# Patient Record
Sex: FEMALE | Race: WHITE | Employment: OTHER | URBAN - METROPOLITAN AREA
[De-identification: names, ages, dates, MRNs, and addresses within clinical notes are randomized per-mention and may not be internally consistent; named-entity substitution may affect disease eponyms.]

---

## 2017-10-02 PROCEDURE — 88175 CYTOPATH C/V AUTO FLUID REDO: CPT | Performed by: FAMILY MEDICINE

## 2017-10-02 PROCEDURE — 87624 HPV HI-RISK TYP POOLED RSLT: CPT | Performed by: FAMILY MEDICINE

## 2018-09-07 PROCEDURE — 86480 TB TEST CELL IMMUN MEASURE: CPT | Performed by: INTERNAL MEDICINE

## 2018-09-07 PROCEDURE — 80074 ACUTE HEPATITIS PANEL: CPT | Performed by: INTERNAL MEDICINE

## 2018-09-10 PROCEDURE — 82523 COLLAGEN CROSSLINKS: CPT | Performed by: INTERNAL MEDICINE

## 2018-09-10 PROCEDURE — 36415 COLL VENOUS BLD VENIPUNCTURE: CPT | Performed by: INTERNAL MEDICINE

## 2018-11-16 PROCEDURE — 82523 COLLAGEN CROSSLINKS: CPT | Performed by: INTERNAL MEDICINE

## 2019-03-07 PROCEDURE — 82523 COLLAGEN CROSSLINKS: CPT | Performed by: INTERNAL MEDICINE

## 2021-04-29 PROBLEM — D84.9 IMMUNOCOMPROMISED (HCC): Status: ACTIVE | Noted: 2021-04-29

## 2021-08-19 PROBLEM — M06.09 SERONEGATIVE RHEUMATOID ARTHRITIS OF MULTIPLE SITES (HCC): Status: ACTIVE | Noted: 2021-08-19

## 2024-07-11 NOTE — H&P (VIEW-ONLY)
Demi Dubose is a 73 year old female who presents for a pre-operative physical exam.       HPI:   Scheduled for cervical fusion to be done by Dr Ballard  Losing sensation in right arm,  has been dropping thing     Balance off  Seeing cardio next week  Asthma has been controlled     has been using Breo without side effects    Physically feeling well    no HA  vision ok   no chest pain or SOB   no abdominal pain  bowels normal  No other complaints    Current Outpatient Medications   Medication Sig Dispense Refill   • LEVOTHYROXINE 75 MCG Oral Tab TAKE 1 TABLET(75 MCG) BY MOUTH DAILY 90 tablet 1   • LOSARTAN 100 MG Oral Tab TAKE 1 TABLET(100 MG) BY MOUTH DAILY 90 tablet 1   • methotrexate 2.5 MG Oral Tab Take 7 tablets (17.5 mg total) by mouth once a week. 84 tablet 0   • diazePAM 5 MG Oral Tab 1-2 tablets prior to procedure 10 tablet 0   • Fluticasone Furoate-Vilanterol (BREO ELLIPTA) 100-25 MCG/ACT Inhalation Aerosol Powder, Breath Activated Inhale 1 Inhalation into the lungs daily. 3 each 1   • alendronate 70 MG Oral Tab Take 1 tablet (70 mg total) by mouth once a week. 12 tablet 1   • PREDNISONE 20 MG Oral Tab TAKE 1 TABLET(20 MG) BY MOUTH DAILY 90 tablet 1   • methotrexate 2.5 MG Oral Tab Take 7 tablets (17.5 mg total) by mouth every 7 days. 84 tablet 0   • MONTELUKAST 10 MG Oral Tab TAKE 1 TABLET(10 MG) BY MOUTH EVERY NIGHT 90 tablet 1   • carvedilol 6.25 MG Oral Tab Take 6.25 mg by mouth 2 (two) times daily with meals.     • inFLIXimab (REMICADE IV) Inject into the vein. Every 6 weeks     • folic acid 1 MG Oral Tab Take 1 tablet (1,000 mcg total) by mouth daily. 90 tablet 1   • Cholecalciferol (VITAMIN D OR) 2,000iu 1 tab BID      • CALCIUM OR 1 tablet daily     • albuterol (VENTOLIN HFA) 108 (90 Base) MCG/ACT Inhalation Aero Soln Inhale 2 puffs into the lungs every 4 (four) hours as needed for Wheezing. 54 g 3      Allergies:   Augmentin [Amoxicil*    OTHER (SEE COMMENTS)    Comment:Rash and angioedema              Allergy PCN with rash but reports taking keflex             (multiple prescriptions filled in past)  Clavulanic Acid         RASH, ANGIOEDEMA  Latex                   HIVES  Latex                   HIVES  Pcns [Penicillins]      RASH  Cataflam [Diclofena*    OTHER (SEE COMMENTS)    Comment:Cold sores in mouth  Lisinopril              Coughing   Past Medical History:   Diagnosis Date   • Arrhythmia     tachycardia   • Asthma    • CAD (coronary artery disease)    • Chronic rhinitis    • Dermatographism    • Disorder of thyroid    • Extrinsic asthma, unspecified    • Hashimoto's thyroiditis    • High blood pressure    • Hyponatremia    • PPD positive    • Rheumatoid arthritis(714.0)    • Tuberculosis     TESTED POS INH 9 MONTHS NO SX      Past Surgical History:   Procedure Laterality Date   • OTHER      lumbar spine fusion L4-L5 and discectomy    • OTHER SURGICAL HISTORY  09/1991    rt knee menisectomy   • TMJ ARTHROSCOPY DISCECTOMY  2003    c 5-6   • TOTAL HIP REPLACEMENT Left 07/14/2022    @ [facility]   • TUBAL LIGATION  1981      Family History   Problem Relation Age of Onset   • Other (Other) Mother         encephalitis   • Diabetes Maternal Grandmother    • Diabetes Maternal Grandfather    • Heart Disorder Paternal Grandmother    • Diabetes Paternal Grandmother    • Diabetes Paternal Grandfather    • Heart Disorder Son         PAT   • Arthritis Sister    • Other (Other) Sister         asthma   • Other (sjogren's syndrome) Sister         sjogren's Syndrome   • No Known Problems Daughter    • No Known Problems Daughter    • No Known Problems Son      Social History    Socioeconomic History      Marital status:     Tobacco Use      Smoking status: Never      Smokeless tobacco: Never      Tobacco comments: Father smoked    Vaping Use      Vaping status: Never Used    Substance and Sexual Activity      Alcohol use: Yes        Comment: Rarely      Drug use: No      Sexual activity: Yes        Partners:  Male       REVIEW OF SYSTEMS:   GENERAL: feels well otherwise  SKIN: denies any unusual skin lesions  EYES:denies blurred vision or double vision  HEENT: denies nasal congestion, sinus pain or ST  LUNGS: denies shortness of breath with exertion  CARDIOVASCULAR: denies chest pain on exertion  GI: denies abdominal pain,denies heartburn  MUSCULOSKELETAL: denies back pain  NEURO: denies headaches  PSYCHE: denies depression or anxiety  HEMATOLOGIC: denies hx of anemia  ENDOCRINE: denies thyroid history  ALL/ASTHMA: denies hx of allergy or asthma    EXAM:   Pulse 102   Ht 5' 9\" (1.753 m)   Wt 194 lb 3.2 oz (88.1 kg)   LMP 01/01/2001   SpO2 95%   BMI 28.68 kg/m²   GENERAL: well developed, well nourished, in no apparent distress  SKIN: no rashes, no suspicious lesions  HEENT: atraumatic, normocephalic,ears and throat are clear  EYES:PERRLA, EOMI, normal optic disk, conjunctiva are clear  NECK: supple, no adenopathy,no bruits  LUNGS: clear to auscultation  CARDIO: RRR without murmur  GI: good BS's,no masses, HSM or tenderness  MUSCULOSKELETAL: back is not tender,FROM of the back  EXTREMITIES: no cyanosis, clubbing or edema  NEURO: Oriented times three,cranial nerves are intact,motor and sensory are grossly intact  ECG:  NSR   no acute changes  ASSESSMENT AND PLAN:   Demi Dubose is a 73 year old female who presents for a pre-operative physical exam. Patient is to have cervical fusion, to be done by Dr. Ballard. Pt has the following conditions: HTN   Rheumatoid arthritis. Pt has no significant history of cardiac or pulmonary conditions. Pt is a good surgical candidate.

## 2024-07-18 RX ORDER — MULTIVIT-MIN/IRON FUM/FOLIC AC 7.5 MG-4
1 TABLET ORAL DAILY
COMMUNITY

## 2024-07-18 RX ORDER — FLUTICASONE FUROATE AND VILANTEROL 100; 25 UG/1; UG/1
1 POWDER RESPIRATORY (INHALATION) DAILY
COMMUNITY

## 2024-07-29 ENCOUNTER — ANESTHESIA (OUTPATIENT)
Dept: SURGERY | Facility: HOSPITAL | Age: 74
End: 2024-07-29
Payer: MEDICARE

## 2024-07-29 ENCOUNTER — APPOINTMENT (OUTPATIENT)
Dept: GENERAL RADIOLOGY | Facility: HOSPITAL | Age: 74
End: 2024-07-29
Attending: ORTHOPAEDIC SURGERY
Payer: MEDICARE

## 2024-07-29 ENCOUNTER — ANESTHESIA EVENT (OUTPATIENT)
Dept: SURGERY | Facility: HOSPITAL | Age: 74
End: 2024-07-29
Payer: MEDICARE

## 2024-07-29 ENCOUNTER — HOSPITAL ENCOUNTER (INPATIENT)
Facility: HOSPITAL | Age: 74
LOS: 1 days | Discharge: HOME | End: 2024-07-30
Attending: ORTHOPAEDIC SURGERY | Admitting: ORTHOPAEDIC SURGERY
Payer: MEDICARE

## 2024-07-29 DIAGNOSIS — Z98.1 STATUS POST CERVICAL SPINAL FUSION: Primary | ICD-10-CM

## 2024-07-29 PROCEDURE — 76000 FLUOROSCOPY <1 HR PHYS/QHP: CPT | Performed by: ORTHOPAEDIC SURGERY

## 2024-07-29 PROCEDURE — 97161 PT EVAL LOW COMPLEX 20 MIN: CPT

## 2024-07-29 PROCEDURE — 97116 GAIT TRAINING THERAPY: CPT

## 2024-07-29 PROCEDURE — 0RG10A0 FUSION OF CERVICAL VERTEBRAL JOINT WITH INTERBODY FUSION DEVICE, ANTERIOR APPROACH, ANTERIOR COLUMN, OPEN APPROACH: ICD-10-PCS | Performed by: ORTHOPAEDIC SURGERY

## 2024-07-29 PROCEDURE — 01N10ZZ RELEASE CERVICAL NERVE, OPEN APPROACH: ICD-10-PCS | Performed by: ORTHOPAEDIC SURGERY

## 2024-07-29 PROCEDURE — 4A11X4G MONITORING OF PERIPHERAL NERVOUS ELECTRICAL ACTIVITY, INTRAOPERATIVE, EXTERNAL APPROACH: ICD-10-PCS | Performed by: ORTHOPAEDIC SURGERY

## 2024-07-29 PROCEDURE — 94799 UNLISTED PULMONARY SVC/PX: CPT

## 2024-07-29 PROCEDURE — 0RT30ZZ RESECTION OF CERVICAL VERTEBRAL DISC, OPEN APPROACH: ICD-10-PCS | Performed by: ORTHOPAEDIC SURGERY

## 2024-07-29 PROCEDURE — 97530 THERAPEUTIC ACTIVITIES: CPT

## 2024-07-29 DEVICE — PLATE SPNL L20MM 1.6V ANT CERV TI ALLY LEV 1: Type: IMPLANTABLE DEVICE | Site: SPINE CERVICAL | Status: FUNCTIONAL

## 2024-07-29 DEVICE — IMPLANTABLE DEVICE: Type: IMPLANTABLE DEVICE | Site: SPINE CERVICAL | Status: FUNCTIONAL

## 2024-07-29 DEVICE — SCREW SPNL 3.5X13MM ANT CERV TI ALLY ST: Type: IMPLANTABLE DEVICE | Site: SPINE CERVICAL | Status: FUNCTIONAL

## 2024-07-29 DEVICE — COHERE CERVICAL, 7X14X12MM 7°
Type: IMPLANTABLE DEVICE | Site: NECK | Status: FUNCTIONAL
Brand: COHERE

## 2024-07-29 DEVICE — PROPEL PUTTY, SMALL
Type: IMPLANTABLE DEVICE | Site: NECK | Status: FUNCTIONAL
Brand: PROPEL

## 2024-07-29 RX ORDER — LOSARTAN POTASSIUM 100 MG/1
100 TABLET ORAL DAILY
Status: DISCONTINUED | OUTPATIENT
Start: 2024-07-29 | End: 2024-07-30

## 2024-07-29 RX ORDER — HYDROMORPHONE HYDROCHLORIDE 1 MG/ML
0.2 INJECTION, SOLUTION INTRAMUSCULAR; INTRAVENOUS; SUBCUTANEOUS EVERY 2 HOUR PRN
Status: DISCONTINUED | OUTPATIENT
Start: 2024-07-29 | End: 2024-07-30

## 2024-07-29 RX ORDER — BISACODYL 10 MG
10 SUPPOSITORY, RECTAL RECTAL
Status: DISCONTINUED | OUTPATIENT
Start: 2024-07-29 | End: 2024-07-30

## 2024-07-29 RX ORDER — LIDOCAINE HYDROCHLORIDE 10 MG/ML
INJECTION, SOLUTION EPIDURAL; INFILTRATION; INTRACAUDAL; PERINEURAL AS NEEDED
Status: DISCONTINUED | OUTPATIENT
Start: 2024-07-29 | End: 2024-07-29 | Stop reason: SURG

## 2024-07-29 RX ORDER — HYDROMORPHONE HYDROCHLORIDE 1 MG/ML
0.6 INJECTION, SOLUTION INTRAMUSCULAR; INTRAVENOUS; SUBCUTANEOUS EVERY 5 MIN PRN
Status: DISCONTINUED | OUTPATIENT
Start: 2024-07-29 | End: 2024-07-29 | Stop reason: HOSPADM

## 2024-07-29 RX ORDER — TIZANIDINE 4 MG/1
4 TABLET ORAL 3 TIMES DAILY
Status: DISCONTINUED | OUTPATIENT
Start: 2024-07-29 | End: 2024-07-30

## 2024-07-29 RX ORDER — MONTELUKAST SODIUM 10 MG/1
10 TABLET ORAL NIGHTLY
Status: DISCONTINUED | OUTPATIENT
Start: 2024-07-29 | End: 2024-07-30

## 2024-07-29 RX ORDER — DIPHENHYDRAMINE HCL 25 MG
25 CAPSULE ORAL EVERY 4 HOURS PRN
Status: DISCONTINUED | OUTPATIENT
Start: 2024-07-29 | End: 2024-07-30

## 2024-07-29 RX ORDER — GLYCOPYRROLATE 0.2 MG/ML
INJECTION, SOLUTION INTRAMUSCULAR; INTRAVENOUS AS NEEDED
Status: DISCONTINUED | OUTPATIENT
Start: 2024-07-29 | End: 2024-07-29 | Stop reason: SURG

## 2024-07-29 RX ORDER — SODIUM CHLORIDE, SODIUM LACTATE, POTASSIUM CHLORIDE, CALCIUM CHLORIDE 600; 310; 30; 20 MG/100ML; MG/100ML; MG/100ML; MG/100ML
INJECTION, SOLUTION INTRAVENOUS CONTINUOUS
Status: DISCONTINUED | OUTPATIENT
Start: 2024-07-29 | End: 2024-07-30

## 2024-07-29 RX ORDER — ONDANSETRON 2 MG/ML
INJECTION INTRAMUSCULAR; INTRAVENOUS AS NEEDED
Status: DISCONTINUED | OUTPATIENT
Start: 2024-07-29 | End: 2024-07-29 | Stop reason: SURG

## 2024-07-29 RX ORDER — ENEMA 19; 7 G/133ML; G/133ML
1 ENEMA RECTAL ONCE AS NEEDED
Status: DISCONTINUED | OUTPATIENT
Start: 2024-07-29 | End: 2024-07-30

## 2024-07-29 RX ORDER — SODIUM CHLORIDE, SODIUM LACTATE, POTASSIUM CHLORIDE, CALCIUM CHLORIDE 600; 310; 30; 20 MG/100ML; MG/100ML; MG/100ML; MG/100ML
INJECTION, SOLUTION INTRAVENOUS CONTINUOUS
Status: DISCONTINUED | OUTPATIENT
Start: 2024-07-29 | End: 2024-07-29 | Stop reason: HOSPADM

## 2024-07-29 RX ORDER — MORPHINE SULFATE 4 MG/ML
2 INJECTION, SOLUTION INTRAMUSCULAR; INTRAVENOUS EVERY 10 MIN PRN
Status: DISCONTINUED | OUTPATIENT
Start: 2024-07-29 | End: 2024-07-29 | Stop reason: HOSPADM

## 2024-07-29 RX ORDER — DEXAMETHASONE SODIUM PHOSPHATE 4 MG/ML
VIAL (ML) INJECTION AS NEEDED
Status: DISCONTINUED | OUTPATIENT
Start: 2024-07-29 | End: 2024-07-29 | Stop reason: SURG

## 2024-07-29 RX ORDER — LEVOTHYROXINE SODIUM 0.07 MG/1
75 TABLET ORAL
Status: DISCONTINUED | OUTPATIENT
Start: 2024-07-29 | End: 2024-07-30

## 2024-07-29 RX ORDER — POLYETHYLENE GLYCOL 3350 17 G/17G
17 POWDER, FOR SOLUTION ORAL DAILY PRN
Status: DISCONTINUED | OUTPATIENT
Start: 2024-07-29 | End: 2024-07-30

## 2024-07-29 RX ORDER — NALOXONE HYDROCHLORIDE 0.4 MG/ML
80 INJECTION, SOLUTION INTRAMUSCULAR; INTRAVENOUS; SUBCUTANEOUS AS NEEDED
Status: DISCONTINUED | OUTPATIENT
Start: 2024-07-29 | End: 2024-07-29 | Stop reason: HOSPADM

## 2024-07-29 RX ORDER — MORPHINE SULFATE 10 MG/ML
6 INJECTION, SOLUTION INTRAMUSCULAR; INTRAVENOUS EVERY 10 MIN PRN
Status: DISCONTINUED | OUTPATIENT
Start: 2024-07-29 | End: 2024-07-29 | Stop reason: HOSPADM

## 2024-07-29 RX ORDER — HYDROMORPHONE HYDROCHLORIDE 1 MG/ML
0.4 INJECTION, SOLUTION INTRAMUSCULAR; INTRAVENOUS; SUBCUTANEOUS EVERY 2 HOUR PRN
Status: DISCONTINUED | OUTPATIENT
Start: 2024-07-29 | End: 2024-07-30

## 2024-07-29 RX ORDER — SODIUM CHLORIDE, SODIUM LACTATE, POTASSIUM CHLORIDE, CALCIUM CHLORIDE 600; 310; 30; 20 MG/100ML; MG/100ML; MG/100ML; MG/100ML
INJECTION, SOLUTION INTRAVENOUS CONTINUOUS PRN
Status: DISCONTINUED | OUTPATIENT
Start: 2024-07-29 | End: 2024-07-29 | Stop reason: SURG

## 2024-07-29 RX ORDER — DIPHENHYDRAMINE HYDROCHLORIDE 50 MG/ML
25 INJECTION INTRAMUSCULAR; INTRAVENOUS EVERY 4 HOURS PRN
Status: DISCONTINUED | OUTPATIENT
Start: 2024-07-29 | End: 2024-07-30

## 2024-07-29 RX ORDER — ROCURONIUM BROMIDE 10 MG/ML
INJECTION, SOLUTION INTRAVENOUS AS NEEDED
Status: DISCONTINUED | OUTPATIENT
Start: 2024-07-29 | End: 2024-07-29 | Stop reason: SURG

## 2024-07-29 RX ORDER — FLUTICASONE PROPIONATE AND SALMETEROL 100; 50 UG/1; UG/1
1 POWDER RESPIRATORY (INHALATION) 2 TIMES DAILY
Status: DISCONTINUED | OUTPATIENT
Start: 2024-07-29 | End: 2024-07-30

## 2024-07-29 RX ORDER — CARVEDILOL 3.12 MG/1
3.12 TABLET ORAL 2 TIMES DAILY WITH MEALS
Status: DISCONTINUED | OUTPATIENT
Start: 2024-07-29 | End: 2024-07-30

## 2024-07-29 RX ORDER — ACETAMINOPHEN 500 MG
1000 TABLET ORAL ONCE
Status: DISCONTINUED | OUTPATIENT
Start: 2024-07-29 | End: 2024-07-29 | Stop reason: HOSPADM

## 2024-07-29 RX ORDER — ALBUTEROL SULFATE 90 UG/1
2 AEROSOL, METERED RESPIRATORY (INHALATION) EVERY 4 HOURS PRN
Status: DISCONTINUED | OUTPATIENT
Start: 2024-07-29 | End: 2024-07-30

## 2024-07-29 RX ORDER — SODIUM CHLORIDE 9 MG/ML
INJECTION, SOLUTION INTRAVENOUS CONTINUOUS
Status: DISCONTINUED | OUTPATIENT
Start: 2024-07-29 | End: 2024-07-30

## 2024-07-29 RX ORDER — ONDANSETRON 2 MG/ML
4 INJECTION INTRAMUSCULAR; INTRAVENOUS EVERY 6 HOURS PRN
Status: DISCONTINUED | OUTPATIENT
Start: 2024-07-29 | End: 2024-07-30

## 2024-07-29 RX ORDER — TRANEXAMIC ACID 10 MG/ML
INJECTION, SOLUTION INTRAVENOUS AS NEEDED
Status: DISCONTINUED | OUTPATIENT
Start: 2024-07-29 | End: 2024-07-29 | Stop reason: SURG

## 2024-07-29 RX ORDER — HYDROMORPHONE HYDROCHLORIDE 1 MG/ML
0.2 INJECTION, SOLUTION INTRAMUSCULAR; INTRAVENOUS; SUBCUTANEOUS EVERY 5 MIN PRN
Status: DISCONTINUED | OUTPATIENT
Start: 2024-07-29 | End: 2024-07-29 | Stop reason: HOSPADM

## 2024-07-29 RX ORDER — DOCUSATE SODIUM 100 MG/1
100 CAPSULE, LIQUID FILLED ORAL 2 TIMES DAILY
Status: DISCONTINUED | OUTPATIENT
Start: 2024-07-29 | End: 2024-07-30

## 2024-07-29 RX ORDER — HYDROMORPHONE HYDROCHLORIDE 1 MG/ML
0.4 INJECTION, SOLUTION INTRAMUSCULAR; INTRAVENOUS; SUBCUTANEOUS EVERY 5 MIN PRN
Status: DISCONTINUED | OUTPATIENT
Start: 2024-07-29 | End: 2024-07-29 | Stop reason: HOSPADM

## 2024-07-29 RX ORDER — ACETAMINOPHEN 500 MG
1000 TABLET ORAL ONCE
Status: COMPLETED | OUTPATIENT
Start: 2024-07-29 | End: 2024-07-29

## 2024-07-29 RX ORDER — TRAMADOL HYDROCHLORIDE 50 MG/1
50 TABLET ORAL EVERY 6 HOURS PRN
Status: DISCONTINUED | OUTPATIENT
Start: 2024-07-29 | End: 2024-07-30

## 2024-07-29 RX ORDER — ACETAMINOPHEN 325 MG/1
650 TABLET ORAL EVERY 6 HOURS
Status: DISCONTINUED | OUTPATIENT
Start: 2024-07-29 | End: 2024-07-30

## 2024-07-29 RX ORDER — METOCLOPRAMIDE HYDROCHLORIDE 5 MG/ML
10 INJECTION INTRAMUSCULAR; INTRAVENOUS EVERY 8 HOURS PRN
Status: DISCONTINUED | OUTPATIENT
Start: 2024-07-29 | End: 2024-07-30

## 2024-07-29 RX ORDER — SENNOSIDES 8.6 MG
17.2 TABLET ORAL NIGHTLY
Status: DISCONTINUED | OUTPATIENT
Start: 2024-07-29 | End: 2024-07-30

## 2024-07-29 RX ORDER — MORPHINE SULFATE 4 MG/ML
4 INJECTION, SOLUTION INTRAMUSCULAR; INTRAVENOUS EVERY 10 MIN PRN
Status: DISCONTINUED | OUTPATIENT
Start: 2024-07-29 | End: 2024-07-29 | Stop reason: HOSPADM

## 2024-07-29 RX ADMIN — LIDOCAINE HYDROCHLORIDE 50 MG: 10 INJECTION, SOLUTION EPIDURAL; INFILTRATION; INTRACAUDAL; PERINEURAL at 07:40:00

## 2024-07-29 RX ADMIN — ROCURONIUM BROMIDE 5 MG: 10 INJECTION, SOLUTION INTRAVENOUS at 07:40:00

## 2024-07-29 RX ADMIN — DEXAMETHASONE SODIUM PHOSPHATE 8 MG: 4 MG/ML VIAL (ML) INJECTION at 07:40:00

## 2024-07-29 RX ADMIN — SODIUM CHLORIDE, SODIUM LACTATE, POTASSIUM CHLORIDE, CALCIUM CHLORIDE: 600; 310; 30; 20 INJECTION, SOLUTION INTRAVENOUS at 07:32:00

## 2024-07-29 RX ADMIN — SODIUM CHLORIDE, SODIUM LACTATE, POTASSIUM CHLORIDE, CALCIUM CHLORIDE: 600; 310; 30; 20 INJECTION, SOLUTION INTRAVENOUS at 09:39:00

## 2024-07-29 RX ADMIN — GLYCOPYRROLATE 0.1 MG: 0.2 INJECTION, SOLUTION INTRAMUSCULAR; INTRAVENOUS at 07:45:00

## 2024-07-29 RX ADMIN — ONDANSETRON 4 MG: 2 INJECTION INTRAMUSCULAR; INTRAVENOUS at 07:40:00

## 2024-07-29 RX ADMIN — TRANEXAMIC ACID 1000 MG: 10 INJECTION, SOLUTION INTRAVENOUS at 07:50:00

## 2024-07-29 NOTE — BRIEF OP NOTE
Pre-Operative Diagnosis: Cervical myelopathy, spinal stenosis of cervical region     Post-Operative Diagnosis: Cervical myelopathy, spinal stenosis of cervical region      Procedure Performed:   C3-4 anterior cervical discectomy and fusion, use of allograft bone    Surgeons and Role:     * Asher Argueta MD - Primary    Assistant(s):  PA: Pal Zarco PA-C; Luli Velasco PA     Surgical Findings: See Op Note     Specimen: None     Estimated Blood Loss: 20cc      Pal Zarco PA-C  7/29/2024  9:29 AM

## 2024-07-29 NOTE — ANESTHESIA PROCEDURE NOTES
Airway  Date/Time: 7/29/2024 7:41 AM  Urgency: Elective      General Information and Staff    Patient location during procedure: OR  Anesthesiologist: Rashmi Mcdermott MD  Resident/CRNA: Stephanie Terry CRNA  Performed: CRNA   Performed by: Stephanie Terry CRNA  Authorized by: Rashmi Mcdermott MD      Indications and Patient Condition  Indications for airway management: anesthesia  Sedation level: deep  Preoxygenated: yes  Patient position: sniffing  Mask difficulty assessment: 0 - not attempted    Final Airway Details  Final airway type: endotracheal airway      Successful airway: ETT  Cuffed: yes   Successful intubation technique: Video laryngoscopy  Facilitating devices/methods: intubating stylet  Endotracheal tube insertion site: oral  Blade: GlideScope  Blade size: #3  ETT size (mm): 7.5    Cormack-Lehane Classification: grade I - full view of glottis  Placement verified by: capnometry   Measured from: teeth  ETT to teeth (cm): 21  Number of attempts at approach: 1    Additional Comments  Head positioned prior to induction.  Remained in neutral position throughout intubation. 7.5 ETT placed easily with Glidescope 3.

## 2024-07-29 NOTE — ANESTHESIA POSTPROCEDURE EVALUATION
Patient: Demi Dubose    Procedure Summary       Date: 07/29/24 Room / Location: [facility] MAIN OR [address] [place] [address] MAIN OR    Anesthesia Start: 0732 Anesthesia Stop: 0944    Procedure: C3-4 anterior cervical discectomy and fusion, use of allograft bone (Spine Cervical) Diagnosis: (Cervical myelopathy, spinal stenosis of cervical region)    Surgeons: Asher Argueta MD Anesthesiologist: Rashmi Mcdermott MD    Anesthesia Type: general ASA Status: 3            Anesthesia Type: general    Vitals Value Taken Time   /82 07/29/24 0943   Temp 98.1 °F (36.7 °C) 07/29/24 0943   Pulse 75 07/29/24 0944   Resp 15 07/29/24 0944   SpO2 96 % 07/29/24 0944   Vitals shown include unfiled device data.    [facility] AN Post Evaluation:   Patient Evaluated in PACU  Patient Participation: complete - patient participated  Level of Consciousness: awake and alert  Pain Score: 0  Pain Management: adequate  Airway Patency:patent  Dental exam unchanged from preop  Yes    Cardiovascular Status: acceptable  Respiratory Status: acceptable and nasal cannula  Postoperative Hydration acceptable  Comments: Moving all extremities, vss, doing well    Stephanie Terry CRNA  7/29/2024 9:44 AM

## 2024-07-29 NOTE — PHYSICAL THERAPY NOTE
PHYSICAL THERAPY EVALUATION - INPATIENT     Room Number: 431/431-A  Evaluation Date: 7/29/2024  Type of Evaluation: Initial   Physician Order: PT Eval and Treat    Presenting Problem: Cervical myelopathy, spinal stenosis of cervical region, s/p C3-4 anterior cervical discectomy and fusion, use of allograft bone     Reason for Therapy: Mobility Dysfunction and Discharge Planning    PHYSICAL THERAPY ASSESSMENT   Patient is a 73 year old female admitted 7/29/2024 for Cervical myelopathy, spinal stenosis of cervical region, s/p C3-4 anterior cervical discectomy and fusion, use of allograft bone. Prior to admission, patient's baseline is Mod I for functional mobility with use of cane, assist as needed with ADLs from daughter. Patient is currently functioning below baseline with bed mobility, transfers, gait, stair negotiation, standing prolonged periods, and performing household tasks.  Patient is requiring contact guard assist and minimal assist as a result of the following impairments: decreased functional strength, decreased endurance/aerobic capacity, impaired standing balance, decreased muscular endurance, medical status, and limited cervical ROM.  Physical Therapy will continue to follow for duration of hospitalization.    Patient will benefit from continued skilled PT Services at discharge to promote prior level of function and safety with additional support and return home with OP PT when medically cleared.    PLAN  PT Treatment Plan: Bed mobility;Body mechanics;Coordination;Endurance;Energy conservation;Patient education;Gait training;Strengthening;Stair training;Balance training;Transfer training  Rehab Potential : Good  Frequency (Obs): Daily    PHYSICAL THERAPY MEDICAL/SOCIAL HISTORY     Problem List  Active Problems:    Status post cervical spinal fusion      HOME SITUATION  Home Situation  Type of Home: House  Home Layout: One level;Ramped entrance  Stairs to Enter : 4 (to get to kitchen level)  Railing:  Yes  Lives With: Daughter (sister lives across the street, son lives nearby)  Drives: Yes  Patient Owned Equipment: Cane;Rolling walker  Patient Regularly Uses: Glasses     SUBJECTIVE  \"I'm a retired nurse\"    PHYSICAL THERAPY EXAMINATION   OBJECTIVE  Precautions: Spine;Cervical brace (Aspen collar at all times)  Fall Risk: Standard fall risk    WEIGHT BEARING RESTRICTION  Weight Bearing Restriction: None    PAIN ASSESSMENT  Ratin    COGNITION  Overall Cognitive Status:  WFL - within functional limits    RANGE OF MOTION AND STRENGTH ASSESSMENT  Upper extremity ROM and strength are limited to less than shoulder height bilaterally   Lower extremity ROM is within functional limits   Lower extremity strength is within functional limits     BALANCE  Static Sitting: Good  Dynamic Sitting: Fair +  Static Standing: Fair  Dynamic Standing: Fair -    NEUROLOGICAL FINDINGS  Sensation: \"feels like sand under my skin\" RUE>RUE, RLE>LLE    AM-PAC '6-Clicks' INPATIENT SHORT FORM - BASIC MOBILITY  How much difficulty does the patient currently have...  Patient Difficulty: Turning over in bed (including adjusting bedclothes, sheets and blankets)?: A Little   Patient Difficulty: Sitting down on and standing up from a chair with arms (e.g., wheelchair, bedside commode, etc.): A Little   Patient Difficulty: Moving from lying on back to sitting on the side of the bed?: A Little   How much help from another person does the patient currently need...   Help from Another: Moving to and from a bed to a chair (including a wheelchair)?: A Little   Help from Another: Need to walk in hospital room?: A Little   Help from Another: Climbing 3-5 steps with a railing?: A Little     AM-PAC Score:  Raw Score: 18   Approx Degree of Impairment: 46.58%   Standardized Score (AM-PAC Scale): 43.63   CMS Modifier (G-Code): CK    FUNCTIONAL ABILITY STATUS  Functional Mobility/Gait Assessment  Gait Assistance: Contact guard assist  Distance (ft): 125 ft and  20 ft x 2  Assistive Device: Rolling walker  Pattern: Shuffle;L Foot flat;R Foot flat (decreased yomaira speed, decreased step length, narrow DEON, slightly guarded)  Rolling: minimal assist via log roll technique   Supine to Sit: minimal assist via log roll technique; cues provided for appropriate technique   Sit to Stand: contact guard assist from EOB x 2 trials and minimal assist from toilet    Exercise/Education Provided:  Bed mobility  Body mechanics  Energy conservation  Functional activity tolerated  Gait training  Posture  Transfer training    Skilled Therapy Provided: Patient with spinal precautions and Aspen collar to be worn at all times. Patient in bed upon arrival. RN approved activity. Educated patient on POC and benefits of mobilization. Agreeable to participate. Patient reporting no pain. Educated patient on post-op spinal precautions, log rolling, progressive mobility, and frequent position changes. Provided 'Neck News' handout for reference. Patient with good carryover throughout. Patient reports that her daughter will be home to assist as needed upon returning home.    The patient's Approx Degree of Impairment: 46.58% has been calculated based on documentation in the [facility] '6 clicks' Inpatient Basic Mobility Short Form.  Research supports that patients with this level of impairment may benefit from OP PT when medically cleared.  Final disposition will be made by interdisciplinary medical team.    Patient End of Session: Needs met;Call light within reach;RN aware of session/findings;All patient questions and concerns addressed    CURRENT GOALS  Goals to be met by: 8/5/24  Patient Goal Patient's self-stated goal is: go home   Goal #1 Patient is able to demonstrate supine - sit EOB @ level: supervision     Goal #1   Current Status    Goal #2 Patient is able to demonstrate transfers Sit to/from Stand at assistance level: supervision with walker - rolling     Goal #2  Current Status    Goal #3 Patient  is able to ambulate 200 feet with assist device: walker - rolling at assistance level: supervision   Goal #3   Current Status    Goal #4 Patient will negotiate 4 stairs/one curb w/ assistive device and supervision   Goal #4   Current Status    Goal #5 Patient to demonstrate independence with home activity/exercise instructions provided to patient in preparation for discharge.   Goal #5   Current Status      Patient Evaluation Complexity Level:  History Moderate - 1 or 2 personal factors and/or co-morbidities   Examination of body systems Moderate - addressing a total of 3 or more elements   Clinical Presentation Low- Stable   Clinical Decision Making  Low Complexity     Gait Training: 15 minutes  Therapeutic Activity:  15 minutes

## 2024-07-29 NOTE — INTERVAL H&P NOTE
Pre-op Diagnosis: Cervical myelopathy, spinal stenosis of cervical region    The above referenced H&P was reviewed by Louis Argueta MD on 7/29/2024, the patient was examined and no significant changes have occurred in the patient's condition since the H&P was performed.  I discussed with the patient and/or legal representative the potential benefits, risks and side effects of this procedure; the likelihood of the patient achieving goals; and potential problems that might occur during recuperation.  I discussed reasonable alternatives to the procedure, including risks, benefits and side effects related to the alternatives and risks related to not receiving this procedure.  We will proceed with procedure as planned.

## 2024-07-29 NOTE — PLAN OF CARE
Patient alert and orientated x4. Post-op day #0. Dressing in place to neck. VSS. SL. Tolerating diet. Voiding freely.  SCDs on for DVT prophylaxis.  Up with x1 assist and a walker. Encouraged frequent ambulation and use of incentive spirometer. Fall precautions maintained- bed alarm on, bed locked in lowest position, call light and personal belongings within reach, non-skid socks in place to bilateral feet. Frequent rounding by nursing staff. Plan to discharge who when medically cleared.              Problem: Patient Centered Care  Goal: Patient preferences are identified and integrated in the patient's plan of care  Description: Interventions:  - What would you like us to know as we care for you?  - Provide timely, complete, and accurate information to patient/family  - Incorporate patient and family knowledge, values, beliefs, and cultural backgrounds into the planning and delivery of care  - Encourage patient/family to participate in care and decision-making at the level they choose  - Honor patient and family perspectives and choices  Outcome: Progressing     Problem: Patient/Family Goals  Goal: Patient/Family Long Term Goal  Description: Patient's Long Term Goal:     Interventions:    - See additional Care Plan goals for specific interventions  Outcome: Progressing  Goal: Patient/Family Short Term Goal  Description: Patient's Short Term Goal:     Interventions:   - See additional Care Plan goals for specific interventions  Outcome: Progressing     Problem: PAIN - ADULT  Goal: Verbalizes/displays adequate comfort level or patient's stated pain goal  Description: INTERVENTIONS:  - Encourage pt to monitor pain and request assistance  - Assess pain using appropriate pain scale  - Administer analgesics based on type and severity of pain and evaluate response  - Implement non-pharmacological measures as appropriate and evaluate response  - Consider cultural and social influences on pain and pain management  -  Manage/alleviate anxiety  - Utilize distraction and/or relaxation techniques  - Monitor for opioid side effects  - Notify MD/LIP if interventions unsuccessful or patient reports new pain  - Anticipate increased pain with activity and pre-medicate as appropriate  Outcome: Progressing     Problem: SAFETY ADULT - FALL  Goal: Free from fall injury  Description: INTERVENTIONS:  - Assess pt frequently for physical needs  - Identify cognitive and physical deficits and behaviors that affect risk of falls.  - [place] fall precautions as indicated by assessment.  - Educate pt/family on patient safety including physical limitations  - Instruct pt to call for assistance with activity based on assessment  - Modify environment to reduce risk of injury  - Provide assistive devices as appropriate  - Consider OT/PT consult to assist with strengthening/mobility  - Encourage toileting schedule  Outcome: Progressing     Problem: DISCHARGE PLANNING  Goal: Discharge to home or other facility with appropriate resources  Description: INTERVENTIONS:  - Identify barriers to discharge w/pt and caregiver  - Include patient/family/discharge partner in discharge planning  - Arrange for needed discharge resources and transportation as appropriate  - Identify discharge learning needs (meds, wound care, etc)  - Arrange for interpreters to assist at discharge as needed  - Consider post-discharge preferences of patient/family/discharge partner  - Complete POLST form as appropriate  - Assess patient's ability to be responsible for managing their own health  - Refer to Case Management Department for coordinating discharge planning if the patient needs post-hospital services based on physician/LIP order or complex needs related to functional status, cognitive ability or social support system  Outcome: Progressing     Problem: CARDIOVASCULAR - ADULT  Goal: Maintains optimal cardiac output and hemodynamic stability  Description: INTERVENTIONS:  -  Monitor vital signs, rhythm, and trends  - Monitor for bleeding, hypotension and signs of decreased cardiac output  - Evaluate effectiveness of vasoactive medications to optimize hemodynamic stability  - Monitor arterial and/or venous puncture sites for bleeding and/or hematoma  - Assess quality of pulses, skin color and temperature  - Assess for signs of decreased coronary artery perfusion - ex. Angina  - Evaluate fluid balance, assess for edema, trend weights  Outcome: Progressing  Goal: Absence of cardiac arrhythmias or at baseline  Description: INTERVENTIONS:  - Continuous cardiac monitoring, monitor vital signs, obtain 12 lead EKG if indicated  - Evaluate effectiveness of antiarrhythmic and heart rate control medications as ordered  - Initiate emergency measures for life threatening arrhythmias  - Monitor electrolytes and administer replacement therapy as ordered  Outcome: Progressing

## 2024-07-29 NOTE — ANESTHESIA PREPROCEDURE EVALUATION
Anesthesia PreOp Note    HPI:     Demi Dubose is a 73 year old female who presents for preoperative consultation requested by: Asher Argueta MD    Date of Surgery: 7/29/2024    Procedure(s):  C3-4 anterior cervical discectomy and fusion, use of allograft bone  Indication: Cervical myelopathy, spinal stenosis of cervical region    Relevant Problems   No relevant active problems       NPO:  Last Liquid Consumption Date: 07/28/24  Last Liquid Consumption Time: 0400 (water with meds)  Last Solid Consumption Date: 07/28/24  Last Solid Consumption Time: 2330  Last Liquid Consumption Date: 07/28/24          History Review:  Patient Active Problem List    Diagnosis Date Noted    Seronegative rheumatoid arthritis of multiple sites (HCC) 08/19/2021    Immunocompromised (HCC) 04/29/2021    Seronegative rheumatoid arthritis of multiple sites (HCC) 05/06/2016    Cervical radiculopathy 02/20/2015    Degenerative joint disease (DJD) of lumbar spine 12/02/2014    Osteopenia of both hips 12/02/2014    Low back pain 12/01/2014    BMI 31.0-31.9,adult 08/06/2014    Dermatographism     Vitamin D deficiency 07/20/2013    Asthma (HCC) 07/05/2013    Anxiety 07/05/2013    Hypothyroid 11/24/2009    HTN (hypertension) 11/24/2009    PPD positive, treated 11/24/2009       Past Medical History:    Arrhythmia    tachycardia    Asthma (HCC)    Back problem    CAD (coronary artery disease)    Cataract    Chronic rhinitis    Dermatographism    Disorder of thyroid    Extrinsic asthma, unspecified    Hashimoto's thyroiditis    High blood pressure    Muscle weakness    Neuropathy    Osteoarthritis    PONV (postoperative nausea and vomiting)    PPD positive    Rheumatoid arthritis(714.0)    Tuberculosis    TESTED POS INH 9 MONTHS NO SX    Venous insufficiency    Visual impairment       Past Surgical History:   Procedure Laterality Date    Cataract extraction extracapsular w/ intraocular lens implantation Left     Other      lumbar spine fusion  L4-L5 and discectomy     Other surgical history  09/1991    rt knee menisectomy    Spine surgery procedure unlisted      Tmj arthroscopy discectomy  2003    c 5-6    Total hip replacement      Total knee replacement      Tubal ligation  1981       Medications Prior to Admission   Medication Sig Dispense Refill Last Dose    fluticasone furoate-vilanterol (BREO ELLIPTA) 100-25 MCG/ACT Inhalation Aerosol Powder, Breath Activated Inhale 1 puff into the lungs daily.   7/29/2024    Multiple Vitamins-Minerals (MULTI-VITAMIN/MINERALS) Oral Tab Take 1 tablet by mouth daily.   Past Week    losartan 100 MG Oral Tab Take 1 tablet (100 mg total) by mouth daily. 90 tablet 1 7/28/2024    carvedilol 3.125 MG Oral Tab Take 1 tablet (3.125 mg total) by mouth 2 (two) times daily with meals.   7/29/2024    LEVOTHYROXINE 75 MCG Oral Tab TAKE 1 TABLET(75 MCG) BY MOUTH DAILY 90 tablet 1 7/29/2024    alendronate 70 MG Oral Tab Take 1 tablet (70 mg total) by mouth once a week. (Patient taking differently: Take 1 tablet (70 mg total) by mouth Every Sunday.) 12 tablet 1 7/28/2024    predniSONE 20 MG Oral Tab Take 1 tablet (20 mg total) by mouth daily. (Patient taking differently: Take 1 tablet (20 mg total) by mouth 2 (two) times daily as needed.) 90 tablet 1 Past Week    MONTELUKAST 10 MG Oral Tab TAKE 1 TABLET(10 MG) BY MOUTH EVERY NIGHT 90 tablet 1 7/28/2024    inFLIXimab (REMICADE IV) Inject into the vein. Every 6 weeks   5/31/2024    folic acid 1 MG Oral Tab Take 1 tablet (1,000 mcg total) by mouth daily. 90 tablet 1 Past Week    acetaminophen 325 MG Oral Tab Take 2 tablets (650 mg total) by mouth every 8 (eight) hours as needed for Pain.  0 7/28/2024    Cholecalciferol (VITAMIN D OR) 4,000 Units daily.   Past Week    CALCIUM OR 1 tablet daily   Past Week    methotrexate 2.5 MG Oral Tab Take 5 tablets (12.5 mg total) by mouth every 7 days. (Patient taking differently: Take 7 tablets (17.5 mg total) by mouth every 7 days. Takes on  Saturdays) 60 tablet 0 7/13/2024    VENTOLIN  (90 Base) MCG/ACT Inhalation Aero Soln Inhale 2 puffs into the lungs every 4 (four) hours as needed for Wheezing. 54 g 1 Unknown     Current Facility-Administered Medications Ordered in Epic   Medication Dose Route Frequency Provider Last Rate Last Admin    lactated ringers infusion   Intravenous Continuous Asher Argueta MD         No current [facility]-ordered outpatient medications on file.       Allergies   Allergen Reactions    Augmentin [Amoxicillin-Pot Clavulanate] OTHER (SEE COMMENTS)     Rash and angioedema  Denies organ damage, blisters or tissue sloughing    Latex HIVES    Pcns [Penicillins] RASH     Denies organ damage, tissue sloughing, blisters    Cataflam [Diclofenac] OTHER (SEE COMMENTS)     Cold sores in mouth    Lisinopril Coughing       Family History   Problem Relation Age of Onset    Other (Other) Mother         encephalitis    Diabetes Maternal Grandmother     Diabetes Maternal Grandfather     Heart Disorder Paternal Grandmother     Diabetes Paternal Grandmother     Diabetes Paternal Grandfather     Heart Disorder Son         PAT    Arthritis Sister     Other (Other) Sister         asthma    Other (sjogren's syndrome) Sister         sjogren's Syndrome    No Known Problems Daughter     No Known Problems Daughter     No Known Problems Son      Social History     Socioeconomic History    Marital status:    Tobacco Use    Smoking status: Never    Smokeless tobacco: Never    Tobacco comments:     Father smoked   Vaping Use    Vaping status: Never Used   Substance and Sexual Activity    Alcohol use: Yes     Comment: Rarely    Drug use: No    Sexual activity: Yes     Partners: Male       Available pre-op labs reviewed.             Vital Signs:  Body mass index is 28.5 kg/m².   height is 1.753 m (5' 9\") and weight is 87.5 kg (193 lb). Her oral temperature is 98.4 °F (36.9 °C). Her blood pressure is 137/81 and her pulse is 90. Her respiration is 18  and oxygen saturation is 96%.   Vitals:    07/18/24 1130 07/29/24 0600   BP:  137/81   Pulse:  90   Resp:  18   Temp:  98.4 °F (36.9 °C)   TempSrc:  Oral   SpO2:  96%   Weight: 88 kg (194 lb) 87.5 kg (193 lb)   Height: 1.753 m (5' 9\")         Anesthesia Evaluation     Patient summary reviewed and Nursing notes reviewed    History of anesthetic complications   Airway   Mallampati: II  TM distance: >3 FB  Neck ROM: full  Dental          Pulmonary - normal exam   (+) asthma  Cardiovascular - normal exam  Exercise tolerance: good  (+) hypertension, CAD  (-) past MI    NYHA Classification: I  ECG reviewed  ROS comment: DIAGNOSTIC IMPRESSION, RECOMMENDATIONS, AND PLANS:  1. Cervical radiculopathy  She anticipates surgery with Dr. Argueta     2. Benign hypertension  Veronica MPI (EF 0.48 (48%), SInus tach 107, no arrhythmia, no CP. No EKG changes. Small, mild, fixed defect mid ant wall, likely d/t breast attenuation. No evidence of ischemia. Abnl regional wall thickening)   BP under good control     3. Preop cardiovascular exam  There are no cardiac contraindications to her proceeding with her planned cervical neck surgery     4. Edema of lower extremity  Known venous insufficiency by previous evaluation  Recent evaluation by Dr Covarrubias reviewed. Conservative therapy.        No follow-ups on file.      Lorraine Ardon MD 7.15.24        Impressions:    - Abnormal nuclear perfusion study due to reduced quantitative ejection    fraction.  - There is no evidence of myocardial ischemia.  - There is no evidence of myocardial infarction.  - Abnormal regional wall thickening is noted on gated SPECT analysis.  - Abnormal ejection fraction.    Prepared and signed by  Reymundo Pitt MD  11/18/2021 11:00         Exam Ended: 11/18/21 09:12          Neuro/Psych    (+)  neuromuscular disease, anxiety/panic attacks,        GI/Hepatic/Renal    (-) hepatitis, liver disease    Endo/Other    (+) hypothyroidism, arthritis    Comments:    ASSESSMENT/PLAN:  Diagnoses and all orders for this visit:     Chronic pain of both shoulders  -     XR CERVICAL SPINE (2-3 VIEWS) (CPT=72040); Future  -     OFFICE/OUTPT VISIT,EST,LEVL IV     Tachycardia, paroxysmal (HCC)--stable  -     OFFICE/OUTPT VISIT,EST,LEVL IV     Primary hypertension  -     OFFICE/OUTPT VISIT,EST,LEVL IV     Seronegative rheumatoid arthritis of multiple sites (HCC)--patient seeing rheum  -     OFFICE/OUTPT VISIT,EST,LEVL IV         -     pain control discussed     Asthma--stable  -     Fluticasone-Umeclidin-Vilant (TRELEGY ELLIPTA) 200-62.5-25 MCG/ACT Inhalation Aerosol Powder, Breath Activated; Inhale 1 puff into the lungs daily.       Abdominal  - normal exam                 Anesthesia Plan:   ASA:  3  Plan:   General  Airway:  ETT and Video laryngoscope  Informed Consent Plan and Risks Discussed With:  Patient and child/children  Discussed plan with:  Attending, CRNA and surgeon  Provider Attestation (if preop done by other):  GA/ETT/PONV,dental damages etc  Video       I have informed Demi Dubose and/or legal guardian or family member of the nature of the anesthetic plan, benefits, risks including possible dental damage if relevant, major complications, and any alternative forms of anesthetic management.   All of the patient's questions were answered to the best of my ability. The patient desires the anesthetic management as planned.  CHARLES CHOWDARY MD  7/29/2024 6:19 AM  Present on Admission:  **None**

## 2024-07-29 NOTE — OPERATIVE REPORT
PREOPERATIVE DIAGNOSIS:    1.       Cervical radiculopathy.  2.       Cervical stenosis with Myelopathy.  3.       Cervical disc herniation.     POSTOPERATIVE DIAGNOSIS:    1.       Cervical radiculopathy.  2.       Cervical stenosis with Myelopathy.  3.       Cervical disc herniation.     PROCEDURE:    1.       Anterior cervical discectomy and fusion at C3-4.  2.       Use of interbody cage.   3.       Use of local autograft bone.  4.       Use of allograft bone.  5.       Anterior cervical instrumentation spanning the C3-4.   6.       Use of intraoperative fluoroscopy.  7.       Use of intraoperative neuromonitoring.        ASSISTANTS:   Luli Velasco PA-C, who assisted in positioning, prepping, draping, retracting, and wound closure.     ANESTHESIA:  General endotracheal.     COMPLICATIONS:  None.     ESTIMATED BLOOD LOSS:  20 mL.      DEEP VEIN THROMBOSIS PROPHYLAXIS:  SCDs and TEDs to lower extremities.     PREOPERATIVE ANTIBIOTICS:  Ancef.     IMPLANTS:  Nuvasive.     INDICATIONS:  Patient is a 73-year-old female with neck pain and  upper extremity radicular symptoms.  MRI of the c spine showed evidence of severe cervical stenosis at C3-4 with myelomalacia.  She failed to improve with conservative care.  I recommended surgical intervention and discussed that in detail with the patient.  Informed consent for surgery was obtained.  I specifically discussed with the patient that the risks surgery include, but are not limited to, infection, nerve injury, vessel injury, need for reoperation, possibility he may not improve, anesthetic complications, cardiac complications, and death in the perioperative period.     OPERATIVE TECHNIQUE:  The patient was seen preoperatively and surgical site was marked.  SCDs and TEDs were placed on the lower extremities.    She was brought to the OR and, after a successful induction of anesthesia, needles for neuromonitoring were placed in the upper and lower extremities.  The  neck was then prepped and draped in the usual manner for a procedure of this sort.  Time-out was then performed.  We then used intraoperative fluoroscopy to cesar the skin for our left-sided incision.  A left sided transverse incision was made from midline to the medial aspect of the sternocleomastoid at the level of the C3-4 disc space segment.  A knife was used for the skin, electrocautery for hemostasis.  The platysma was incised in line with the skin incision, and careful dissection was carried down through avascular planes exposing the 2 vertebral segments.  We verified our levels on lateral fluoroscopy.  We elevated the longus colli muscles bilaterally at  C3-4.  We then placed the retractor below the longus colli muscles at the C3-4 level.  Anterior endplate osteophytes were removed using a rongeur.  The bone was saved to be used as autograft.  Discectomy was performed by first using a knife to create an annulotomy.  Yonatan, curettes, and pituitaries were used to perform a complete discectomy. We removed the posterior longitudinal ligament  using 1 and 2 mm Kerrisons, careful not to injure the dura or the cord.  Soft disc material was encountered and removed off the cord and bilateral neuroforamen,  Bilateral foraminotomies were performed using 1 and 2 mm Kerrisons.   At this level, we elected to use a 7 mm cage.  I placed local autograft and allograft bone into the cage and then placed it under fluoroscopic guidance.    Once that was in good position, an anterior cervical plate measuring 20mm in length was then placed spanning the C4-5 level and 15 mm screws, 2 in each segment, were used to secure the plate down.  Locking mechanism was turned over each screw head to prevent screw backout.  Copious irrigation of the wound was again performed.  Bipolar electrocautery was used to control some mild bleeding from the epidural veins.  Final AP and lateral images were taken showing that the hardware was in good  position.  I then closed the platysma using 2-0 Vicryl, subcutaneous was then closed with 3-0 Vicryl, and then Steri-Strips and skin glue for the skin.  Sterile dressing was applied.  The drapes were removed.  Patient was gently moved to supine position on a regular bed and extubated in the OR.  She was taken to PACU in good condition.  There were no complications during the procedure.  I was present throughout the case.  All counts were correct.

## 2024-07-30 VITALS
TEMPERATURE: 98 F | HEIGHT: 69 IN | RESPIRATION RATE: 18 BRPM | HEART RATE: 89 BPM | BODY MASS INDEX: 28.58 KG/M2 | OXYGEN SATURATION: 95 % | WEIGHT: 193 LBS | DIASTOLIC BLOOD PRESSURE: 89 MMHG | SYSTOLIC BLOOD PRESSURE: 153 MMHG

## 2024-07-30 LAB
ANION GAP SERPL CALC-SCNC: 6 MMOL/L (ref 0–18)
BUN BLD-MCNC: 8 MG/DL (ref 9–23)
BUN/CREAT SERPL: 11.6 (ref 10–20)
CALCIUM BLD-MCNC: 9.2 MG/DL (ref 8.7–10.4)
CHLORIDE SERPL-SCNC: 103 MMOL/L (ref 98–112)
CO2 SERPL-SCNC: 26 MMOL/L (ref 21–32)
CREAT BLD-MCNC: 0.69 MG/DL
EGFRCR SERPLBLD CKD-EPI 2021: 92 ML/MIN/1.73M2 (ref 60–?)
GLUCOSE BLD-MCNC: 98 MG/DL (ref 70–99)
HCT VFR BLD AUTO: 39 %
HGB BLD-MCNC: 13.2 G/DL
OSMOLALITY SERPL CALC.SUM OF ELEC: 278 MOSM/KG (ref 275–295)
POTASSIUM SERPL-SCNC: 4.2 MMOL/L (ref 3.5–5.1)
SODIUM SERPL-SCNC: 135 MMOL/L (ref 136–145)

## 2024-07-30 PROCEDURE — 97530 THERAPEUTIC ACTIVITIES: CPT

## 2024-07-30 PROCEDURE — 85018 HEMOGLOBIN: CPT | Performed by: PHYSICIAN ASSISTANT

## 2024-07-30 PROCEDURE — 97165 OT EVAL LOW COMPLEX 30 MIN: CPT

## 2024-07-30 PROCEDURE — 97116 GAIT TRAINING THERAPY: CPT

## 2024-07-30 PROCEDURE — 80048 BASIC METABOLIC PNL TOTAL CA: CPT | Performed by: PHYSICIAN ASSISTANT

## 2024-07-30 PROCEDURE — 85014 HEMATOCRIT: CPT | Performed by: PHYSICIAN ASSISTANT

## 2024-07-30 RX ORDER — TRAMADOL HYDROCHLORIDE 50 MG/1
50 TABLET ORAL EVERY 6 HOURS PRN
Qty: 40 TABLET | Refills: 0 | Status: SHIPPED | OUTPATIENT
Start: 2024-07-30

## 2024-07-30 RX ORDER — PSEUDOEPHEDRINE HCL 30 MG
100 TABLET ORAL 2 TIMES DAILY
Status: SHIPPED | COMMUNITY
Start: 2024-07-30

## 2024-07-30 RX ORDER — TIZANIDINE 4 MG/1
4 TABLET ORAL 3 TIMES DAILY
Qty: 42 TABLET | Refills: 0 | Status: SHIPPED | OUTPATIENT
Start: 2024-07-30 | End: 2024-08-13

## 2024-07-30 NOTE — PLAN OF CARE
POD1. Dressing clean dry and intact. Aspen collar on at all times. RN dysphagia screening done. Passed. AOX4. Room air. General diet, tolerated well. SCDS and teds for dvt prophylaxis. Voiding freely. Passing flatus. PRN tramadol given for pain management. Discharging home. Cleared for discharge by Mds. Discharge instructions given at bedside.    Problem: Patient Centered Care  Goal: Patient preferences are identified and integrated in the patient's plan of care  Description: Interventions:  - What would you like us to know as we care for you?   - Provide timely, complete, and accurate information to patient/family  - Incorporate patient and family knowledge, values, beliefs, and cultural backgrounds into the planning and delivery of care  - Encourage patient/family to participate in care and decision-making at the level they choose  - Honor patient and family perspectives and choices  Outcome: Adequate for Discharge     Problem: Patient/Family Goals  Goal: Patient/Family Long Term Goal  Description: Patient's Long Term Goal:     Interventions:  -   - See additional Care Plan goals for specific interventions  Outcome: Adequate for Discharge  Goal: Patient/Family Short Term Goal  Description: Patient's Short Term Goal:    Interventions:   - See additional Care Plan goals for specific interventions  Outcome: Adequate for Discharge     Problem: PAIN - ADULT  Goal: Verbalizes/displays adequate comfort level or patient's stated pain goal  Description: INTERVENTIONS:  - Encourage pt to monitor pain and request assistance  - Assess pain using appropriate pain scale  - Administer analgesics based on type and severity of pain and evaluate response  - Implement non-pharmacological measures as appropriate and evaluate response  - Consider cultural and social influences on pain and pain management  - Manage/alleviate anxiety  - Utilize distraction and/or relaxation techniques  - Monitor for opioid side effects  - Notify MD/LIP  if interventions unsuccessful or patient reports new pain  - Anticipate increased pain with activity and pre-medicate as appropriate  Outcome: Adequate for Discharge     Problem: SAFETY ADULT - FALL  Goal: Free from fall injury  Description: INTERVENTIONS:  - Assess pt frequently for physical needs  - Identify cognitive and physical deficits and behaviors that affect risk of falls.  - S[place] fall precautions as indicated by assessment.  - Educate pt/family on patient safety including physical limitations  - Instruct pt to call for assistance with activity based on assessment  - Modify environment to reduce risk of injury  - Provide assistive devices as appropriate  - Consider OT/PT consult to assist with strengthening/mobility  - Encourage toileting schedule  Outcome: Adequate for Discharge     Problem: DISCHARGE PLANNING  Goal: Discharge to home or other facility with appropriate resources  Description: INTERVENTIONS:  - Identify barriers to discharge w/pt and caregiver  - Include patient/family/discharge partner in discharge planning  - Arrange for needed discharge resources and transportation as appropriate  - Identify discharge learning needs (meds, wound care, etc)  - Arrange for interpreters to assist at discharge as needed  - Consider post-discharge preferences of patient/family/discharge partner  - Complete POLST form as appropriate  - Assess patient's ability to be responsible for managing their own health  - Refer to Case Management Department for coordinating discharge planning if the patient needs post-hospital services based on physician/LIP order or complex needs related to functional status, cognitive ability or social support system  Outcome: Adequate for Discharge     Problem: CARDIOVASCULAR - ADULT  Goal: Maintains optimal cardiac output and hemodynamic stability  Description: INTERVENTIONS:  - Monitor vital signs, rhythm, and trends  - Monitor for bleeding, hypotension and signs of decreased  cardiac output  - Evaluate effectiveness of vasoactive medications to optimize hemodynamic stability  - Monitor arterial and/or venous puncture sites for bleeding and/or hematoma  - Assess quality of pulses, skin color and temperature  - Assess for signs of decreased coronary artery perfusion - ex. Angina  - Evaluate fluid balance, assess for edema, trend weights  Outcome: Adequate for Discharge  Goal: Absence of cardiac arrhythmias or at baseline  Description: INTERVENTIONS:  - Continuous cardiac monitoring, monitor vital signs, obtain 12 lead EKG if indicated  - Evaluate effectiveness of antiarrhythmic and heart rate control medications as ordered  - Initiate emergency measures for life threatening arrhythmias  - Monitor electrolytes and administer replacement therapy as ordered  Outcome: Adequate for Discharge

## 2024-07-30 NOTE — DISCHARGE INSTRUCTIONS
NO lifting over 10 pounds or ROM of the cervical spine  Cervical brace on at all times  May shower 48 hours after surgery with incision covered  Daily dressing changes, today's completed by me  Please send patient home with gauze and tape  Rx meds in the chart  Avoid prednisone and DMARDs x 2 weeks post op  May d/c to home from ortho standpoint  Follow up with Dr. Argueta in office in 2 weeks

## 2024-07-30 NOTE — OCCUPATIONAL THERAPY NOTE
OCCUPATIONAL THERAPY EVALUATION - INPATIENT     Room Number: 431/431-A  Evaluation Date: 7/30/2024  Type of Evaluation: Initial  Presenting Problem: C4-5 ACDF    Physician Order: IP Consult to Occupational Therapy  Reason for Therapy: ADL/IADL Dysfunction and Discharge Planning    OCCUPATIONAL THERAPY ASSESSMENT   Patient is a 73 year old female admitted 7/29/2024 for C3-4 ACDF.  Pt w/ orders for spine precautions and Clemons collar. Prior to admission, patient was living w/ her daughter in a 1 story home. Pt w/ ramp entry and 4 stairs to kitchen. Pt reported that her daughter assists w/ adls as needed. Pt ambulated w/ a cane and was driving. Pt is a retired nurse.  Patient is currently functioning near baseline with adls and functional mobility.    PLAN  Patient has been evaluated and presents with no skilled Occupational Therapy needs  at this time.  Patient will be discharged from Occupational Therapy services.   OT Device Recommendations: -- (button hook)      OCCUPATIONAL THERAPY MEDICAL/SOCIAL HISTORY   Problem List  Active Problems:    Status post cervical spinal fusion    HOME SITUATION  Type of Home: House  Home Layout: One level; Ramped entrance  Lives With: Daughter (sister lives across the street, son lives nearby)  Toilet and Equipment: Standard height toilet; Toilet riser with arms  Shower/Tub and Equipment: Tub-shower combo; Shower chair  Other Equipment: Long-handled sponge; Reacher (rw, acne)  Occupation/Status: -- (retired nurse)  Hand Dominance: Right  Drives: Yes  Patient Regularly Uses: Glasses    Stairs in Home: 4  Use of Assistive Device(s): cane    Prior Level of [place]: Pt w/ ramp entry and 4 stairs to kitchen. Pt reported that her daughter assists w/ adls as needed. Pt ambulated w/ a cane and was driving. Pt is a retired nurse.      SUBJECTIVE  \"I have a lot of questions\"    OCCUPATIONAL THERAPY EXAMINATION    OBJECTIVE  Precautions: Cervical brace; Spine  Fall Risk: Standard fall  risk    WEIGHT BEARING RESTRICTION     PAIN ASSESSMENT  Rating: -- (pt offering no c/o pain)  Location: -- (sharif)    ACTIVITY TOLERANCE  good    O2 SATURATIONS  Activity on room air    SENSATION  Pt reports numbness in bilateral hands and arms    Behavioral/Emotional/Social: motivated, receptive to suggestions    RANGE OF MOTION   Upper extremity ROM is significantly limited due to arthritic changes     STRENGTH ASSESSMENT  Upper extremity strength is grossly fair/fair-    COORDINATION  Gross Motor:significantly impaired Bue  Fine Motor:significantly impaired due to sensory deficits. Pt describes sensation as \"sand and butter\". Pt reports slight improvement in R upper arm sensation    ACTIVITIES OF DAILY LIVING ASSESSMENT  AM-PAC ‘6-Clicks’ Inpatient Daily Activity Short Form  How much help from another person does the patient currently need…  -   Putting on and taking off regular lower body clothing?: A Little  -   Bathing (including washing, rinsing, drying)?: A Little  -   Toileting, which includes using toilet, bedpan or urinal? : A Little  -   Putting on and taking off regular upper body clothing?: A Little  -   Taking care of personal grooming such as brushing teeth?: None  -   Eating meals?: None    AM-PAC Score:  Score: 20  Approx Degree of Impairment: 38.32%  Standardized Score (AM-PAC Scale): 42.03  CMS Modifier (G-Code): CJ    FUNCTIONAL ADL ASSESSMENT  Eating: independent  Grooming: independent  UB Dressing: setup assist/min a  LB Dressing: setup assist/min a  Toileting: na    Skilled Therapy Provided: OT orders received and chart reviewed. RN approving pt pt participation in therapy.  Pt received up in chair, alert and oriented x 4. Spine precautions reviewed w/ emphasis on adls and transfers including shower. Brace management and wearing schedule reinforced;brace adjusted for comfort.  Pt verbalizing understanding of information as discussed. Spine packet at beside. Pt w/ numerous questions about  managing self care tasks due to limited bue mobility and sensory impairments. Suggestion made for button hook. Foam tubing provided to build up utensils for maximal control. Pt currently performing  transfers w/ supervision. Pt plans to sleep in a recliner and declined need for log roll.      Pt presenting w/ sound judgement and good insight into limitations following surgery    At end of session pt remaining up in chair w/ all needs in reach. RN aware of pt's status and performance in therapy. No further OT contact planned. Anticipate pt will be able to return home w/ support of daughter      EDUCATION PROVIDED  Patient: Role of Occupational Therapy; Plan of Care; Functional Transfer Techniques; Fall Prevention; Posture/Positioning; Compensatory ADL Techniques; Adaptive Equipment Recommendations  Patient's Response to Education: Verbalized Understanding    The patient's Approx Degree of Impairment: 38.32% has been calculated based on documentation in the [facility] '6 clicks' Inpatient Daily Activity Short Form.  Research supports that patients with this level of impairment may benefit from return to home.  Final disposition will be made by interdisciplinary medical team.    Patient End of Session: Up in chair;Needs met;Call light within reach;RN aware of session/findings;All patient questions and concerns addressed      Patient Evaluation Complexity Level:   Occupational Profile/Medical History LOW - Brief history including review of medical or therapy records    Specific performance deficits impacting engagement in ADL/IADL MODERATE  3 - 5 performance deficits   Client Assessment/Performance Deficits MODERATE - Comorbidities and min to mod modifications of tasks    Clinical Decision Making MODERATE - Analysis of occupational profile, detailed assessments, several treatment options    Overall Complexity MODERATE     Therapeutic Activity: 25 minutes

## 2024-07-30 NOTE — H&P
[facility]  part of [facility]    History and Physical    Demi Dubose Patient Status:  Inpatient    1950 MRN [id number]   Location [facility] 4W/SW/SE Attending Farzad Barajas MD   Hosp Day # 1 PCP Jeannette Palacios,      Date:  2024  Date of Admission:  2024    HPI:   No chief complaint on file.    HPI  Pt is a 72 y/o female with PMH of Hypothyroidism, RA, HTN and cervical radiculopathy who was admitted for scheduled C3-4 anterior cervical discectomy and fusion by Dr. Argueta on 24.    POD#1. Pt seen today at bedside. No c/o SOB or N/V/D. Pain controlled on current regimen.   History     Past Medical History:    Arrhythmia    tachycardia    Asthma (HCC)    Back problem    CAD (coronary artery disease)    Cataract    Chronic rhinitis    Dermatographism    Disorder of thyroid    Extrinsic asthma, unspecified    Hashimoto's thyroiditis    High blood pressure    Muscle weakness    Neuropathy    Osteoarthritis    PONV (postoperative nausea and vomiting)    PPD positive    Rheumatoid arthritis(714.0)    Tuberculosis    TESTED POS INH 9 MONTHS NO SX    Venous insufficiency    Visual impairment     Past Surgical History:   Procedure Laterality Date    Cataract extraction extracapsular w/ intraocular lens implantation Left     Other      lumbar spine fusion L4-L5 and discectomy     Other surgical history  1991    rt knee menisectomy    Spine surgery procedure unlisted      Tmj arthroscopy discectomy      c 5-6    Total hip replacement      Total knee replacement      Tubal ligation       Family History   Problem Relation Age of Onset    Other (Other) Mother         encephalitis    Diabetes Maternal Grandmother     Diabetes Maternal Grandfather     Heart Disorder Paternal Grandmother     Diabetes Paternal Grandmother     Diabetes Paternal Grandfather     Heart Disorder Son         PAT    Arthritis Sister     Other (Other) Sister         asthma    Other  (sjogren's syndrome) Sister         sjogren's Syndrome    No Known Problems Daughter     No Known Problems Daughter     No Known Problems Son      Social History:  Social History     Socioeconomic History    Marital status:    Tobacco Use    Smoking status: Never    Smokeless tobacco: Never    Tobacco comments:     Father smoked   Vaping Use    Vaping status: Never Used   Substance and Sexual Activity    Alcohol use: Yes     Comment: Rarely    Drug use: No    Sexual activity: Yes     Partners: Male     Social Determinants of Health     Food Insecurity: No Food Insecurity (7/29/2024)    Food Insecurity     Food Insecurity: Never true   Transportation Needs: No Transportation Needs (7/29/2024)    Transportation Needs     Lack of Transportation: No   Housing Stability: Low Risk  (7/29/2024)    Housing Stability     Housing Instability: No     Allergies/Medications:   Allergies:   Allergies   Allergen Reactions    Augmentin [Amoxicillin-Pot Clavulanate] OTHER (SEE COMMENTS)     Rash and angioedema  Denies organ damage, blisters or tissue sloughing    Latex HIVES    Pcns [Penicillins] RASH     Denies organ damage, tissue sloughing, blisters    Cataflam [Diclofenac] OTHER (SEE COMMENTS)     Cold sores in mouth    Lisinopril Coughing     Medications Prior to Admission   Medication Sig    fluticasone furoate-vilanterol (BREO ELLIPTA) 100-25 MCG/ACT Inhalation Aerosol Powder, Breath Activated Inhale 1 puff into the lungs daily.    Multiple Vitamins-Minerals (MULTI-VITAMIN/MINERALS) Oral Tab Take 1 tablet by mouth daily.    losartan 100 MG Oral Tab Take 1 tablet (100 mg total) by mouth daily.    carvedilol 3.125 MG Oral Tab Take 1 tablet (3.125 mg total) by mouth 2 (two) times daily with meals.    LEVOTHYROXINE 75 MCG Oral Tab TAKE 1 TABLET(75 MCG) BY MOUTH DAILY    alendronate 70 MG Oral Tab Take 1 tablet (70 mg total) by mouth once a week. (Patient taking differently: Take 1 tablet (70 mg total) by mouth Every  Sunday.)    predniSONE 20 MG Oral Tab Take 1 tablet (20 mg total) by mouth daily. (Patient taking differently: Take 1 tablet (20 mg total) by mouth 2 (two) times daily as needed.)    MONTELUKAST 10 MG Oral Tab TAKE 1 TABLET(10 MG) BY MOUTH EVERY NIGHT    inFLIXimab (REMICADE IV) Inject into the vein. Every 6 weeks    folic acid 1 MG Oral Tab Take 1 tablet (1,000 mcg total) by mouth daily.    acetaminophen 325 MG Oral Tab Take 2 tablets (650 mg total) by mouth every 8 (eight) hours as needed for Pain.    Cholecalciferol (VITAMIN D OR) 4,000 Units daily.    CALCIUM OR 1 tablet daily    methotrexate 2.5 MG Oral Tab Take 5 tablets (12.5 mg total) by mouth every 7 days. (Patient taking differently: Take 7 tablets (17.5 mg total) by mouth every 7 days. Takes on Saturdays)    VENTOLIN  (90 Base) MCG/ACT Inhalation Aero Soln Inhale 2 puffs into the lungs every 4 (four) hours as needed for Wheezing.       Review of Systems:     Constitutional:  Negative for chills and fever.   Respiratory:  Negative for cough and shortness of breath.    Cardiovascular:  Negative for chest pain and palpitations.   Gastrointestinal:  Negative for nausea, vomiting and diarrhea.   Genitourinary:  Negative for dysuria.   Musculoskeletal:  Positive for neck pain.   Neurological:  Negative for dizziness and speech difficulty.   Psychiatric/Behavioral:  Negative for confusion and agitation.        Physical Exam:   Vital Signs:  Blood pressure 158/84, pulse 90, temperature 97.6 °F (36.4 °C), temperature source Temporal, resp. rate 16, height 5' 9\" (1.753 m), weight 193 lb (87.5 kg), last menstrual period 01/01/2001, SpO2 96%.  Physical Exam  Vitals and nursing note reviewed.   Constitutional:       Appearance: Normal appearance.   HENT:      Head: Normocephalic and atraumatic.   Eyes:      Conjunctiva/sclera: Conjunctivae normal.   Cardiovascular:      Rate and Rhythm: Normal rate and regular rhythm.      Pulses: Normal pulses.      Heart  sounds: Normal heart sounds.   Pulmonary:      Effort: Pulmonary effort is normal.      Breath sounds: Normal breath sounds.   Abdominal:      General: Bowel sounds are normal.      Palpations: Abdomen is soft.   Musculoskeletal:      Cervical back: Tenderness present. Decreased range of motion.      Comments: Cervical brace   Skin:     General: Skin is warm and dry.   Neurological:      General: No focal deficit present.      Mental Status: She is alert and oriented to person, place, and time.   Psychiatric:         Mood and Affect: Mood normal.         Behavior: Behavior normal.         Results:     Lab Results   Component Value Date    WBC 7.16 03/08/2022    HGB 13.2 07/30/2024    HCT 39.0 07/30/2024     03/08/2022    CREATSERUM 0.69 07/30/2024    BUN 8 (L) 07/30/2024     (L) 07/30/2024    K 4.2 07/30/2024     07/30/2024    CO2 26.0 07/30/2024    GLU 98 07/30/2024    CA 9.2 07/30/2024    ALB 4.3 03/08/2022    ALKPHO 65 03/08/2022    BILT 0.90 03/08/2022    TP 6.9 03/08/2022    AST 17 03/08/2022    ALT 9 03/08/2022    PTT 26.1 12/30/2021    INR 1.0 12/30/2021    T4F 1.47 10/23/2019    TSH 2.390 10/25/2021    ESRML 8 03/08/2022    CRP <0.30 03/08/2022    B12 442 10/25/2021     XR FLUOROSCOPY C-ARM TIME LESS THAN 1 HOUR (CPT=76000)    Result Date: 7/29/2024  CONCLUSION: Intraoperative fluoroscopy provided.  Please see surgical note for specific details.     Dictated by (CST): Philip Caro MD on 7/29/2024 at 9:46 AM     Finalized by (CST): Philip Caro MD on 7/29/2024 at 9:46 AM               Assessment/Plan:   *Cervical radiculopathy  S/P C3-4 anterior cervical discectomy and fusion by Dr. Argueta on 7/29/24  POD#1  Cervical brace  Pain control  Bowel protocol  PT/OT  Ortho following    *HTN  Cont coreg and losartan    *Hypothyroidism  Cont levothyroxine       DVT prophylaxis: SCDs  Code status: FULL CODE    **Certification      PHYSICIAN Certification of Need for Inpatient  Hospitalization - Initial Certification    Patient will require inpatient services that will reasonably be expected to span two midnight's based on the clinical documentation in H+P.   Based on patients current state of illness, I anticipate that, after discharge, patient will require TBD.        SHAVONNE PÉREZ MD  7/30/2024

## 2024-07-30 NOTE — PROGRESS NOTES
Ortho Spine Progress Note      No C/O of CP, SOB, N/V/D. Pain controlled on Tylenol and tramadol. PT at bedside to work with patient this am.  AVSS  Dressing is CDI  Motor to bilateral UE 5/5 throughout  NV intact. Distal pulses are palpable with good cap refill  Homans neg      S/p C3-4 ACDF by Dr. Argueta POD1    Mobilize  PT/OT  NO lifting over 10 pounds or ROM of the cervical spine  Cervical brace on at all times  May shower 48 hours after surgery with incision covered  Daily dressing changes, today's completed by me  Please send patient home with gauze and tape  Rx meds in the chart  Avoid prednisone and DMARDs x 2 weeks post op  May d/c to home from ortho standpoint  Follow up with Dr. Argueta in office in 2 weeks      Chapin Zarco PA-C  PA with Dr. [facility]

## 2024-07-30 NOTE — PHYSICAL THERAPY NOTE
PHYSICAL THERAPY TREATMENT NOTE - INPATIENT     Room Number: 431/431-A       Presenting Problem: Cervical myelopathy, spinal stenosis of cervical region, s/p C3-4 anterior cervical discectomy and fusion, use of allograft bone       Problem List  Active Problems:    Status post cervical spinal fusion      PHYSICAL THERAPY ASSESSMENT   Patient demonstrates good  progress this session, goals  remain in progress.      Patient is requiring minimal assist as a result of the following impairments: decreased functional strength, decreased endurance/aerobic capacity, and pain.     Patient continues to function below baseline with bed mobility, transfers, and gait.  Contributing factors to remaining limitations include decreased functional strength, decreased endurance/aerobic capacity, and pain.  Next session anticipate patient to progress bed mobility, transfers, and gait.  Physical Therapy will continue to follow patient for duration of hospitalization.    Patient continues to benefit from continued skilled PT services: at discharge to promote prior level of function.  Anticipate patient will return home with home health PT.    PLAN  PT Treatment Plan: Bed mobility;Body mechanics;Endurance;Gait training  Frequency (Obs): Daily    SUBJECTIVE  Pt reports being ready for PT RX    OBJECTIVE  Precautions: Cervical brace;Spine    WEIGHT BEARING RESTRICTION                PAIN ASSESSMENT   Ratin          BALANCE  Static Sitting: Good  Dynamic Sitting: Fair +  Static Standing: Fair  Dynamic Standing: Fair -    ACTIVITY TOLERANCE                          O2 WALK       AM-PAC '6-Clicks' INPATIENT SHORT FORM - BASIC MOBILITY  How much difficulty does the patient currently have...  Patient Difficulty: Turning over in bed (including adjusting bedclothes, sheets and blankets)?: A Little   Patient Difficulty: Sitting down on and standing up from a chair with arms (e.g., wheelchair, bedside commode, etc.): A Little   Patient  Difficulty: Moving from lying on back to sitting on the side of the bed?: A Little   How much help from another person does the patient currently need...   Help from Another: Moving to and from a bed to a chair (including a wheelchair)?: A Little   Help from Another: Need to walk in hospital room?: A Little   Help from Another: Climbing 3-5 steps with a railing?: A Little     AM-PAC Score:  Raw Score: 18   Approx Degree of Impairment: 46.58%   Standardized Score (AM-PAC Scale): 43.63   CMS Modifier (G-Code): CK    FUNCTIONAL ABILITY STATUS  Functional Mobility/Gait Assessment  Gait Assistance: Contact guard assist  Distance (ft): 2 x 100  Assistive Device: Rolling walker  Pattern: Shuffle  Rolling: minimal assist  Supine to Sit: minimal assist  Sit to Supine: minimal assist  Sit to Stand: minimal assist    Skilled Therapy Provided: Pt seen daily.Min a for bed mobility and transfer.Extra time provided to complete task.EOB sitting balance activity with emphasis on core stabilization.Spinal hossein precautions reviewed.Pt amb 2 x 100 ft with RW and CGA.Navigated 4 stairs with CGA.There ex.    The patient's Approx Degree of Impairment: 46.58% has been calculated based on documentation in the [facility] '6 clicks' Inpatient Daily Activity Short Form.  Research supports that patients with this level of impairment may benefit from Home with Home Health PT.  Final disposition will be made by interdisciplinary medical team.    THERAPEUTIC EXERCISES  Lower Extremity Ankle pumps  Glut sets  Quad sets     Position Supine       Patient End of Session: Up in chair;Call light within reach;RN aware of session/findings;All patient questions and concerns addressed    CURRENT GOALS     Patient Goal Patient's self-stated goal is: go home   Goal #1 Patient is able to demonstrate supine - sit EOB @ level: supervision     Goal #1   Current Status Min a   Goal #2 Patient is able to demonstrate transfers Sit to/from Stand at assistance level:  supervision with walker - rolling     Goal #2  Current Status Min a   Goal #3 Patient is able to ambulate 200 feet with assist device: walker - rolling at assistance level: supervision   Goal #3   Current Status Pt amb 2 x 100 ft with RW and CGA   Goal #4 Patient will negotiate 4 stairs/one curb w/ assistive device and supervision   Goal #4   Current Status Navigated 4 stairs with CGA   Goal #5 Patient to demonstrate independence with home activity/exercise instructions provided to patient in preparation for discharge.   Goal #5   Current Status In progress     Gait Training: 15 minutes  Therapeutic Activity: 15 minutes  Neuromuscular Re-education:  minutes  Therapeutic Exercise:  minutes  Canalith Repositioning:  minutes  Manual Therapy:  minutes  Can add/delete any of these

## 2024-07-30 NOTE — PLAN OF CARE
Patient is AOX4 on RA. 1x walker. SCDs. Voiding freely. Cervical collar in place. No acute changes during the shift. Kendra Tylenol, Zanaflex and PRN Tramadol given for pain. Plan for Home with Outpt PT.       Problem: Patient Centered Care  Goal: Patient preferences are identified and integrated in the patient's plan of care  Description: Interventions:  - What would you like us to know as we care for you?   - Provide timely, complete, and accurate information to patient/family  - Incorporate patient and family knowledge, values, beliefs, and cultural backgrounds into the planning and delivery of care  - Encourage patient/family to participate in care and decision-making at the level they choose  - Honor patient and family perspectives and choices  Outcome: Progressing     Problem: PAIN - ADULT  Goal: Verbalizes/displays adequate comfort level or patient's stated pain goal  Description: INTERVENTIONS:  - Encourage pt to monitor pain and request assistance  - Assess pain using appropriate pain scale  - Administer analgesics based on type and severity of pain and evaluate response  - Implement non-pharmacological measures as appropriate and evaluate response  - Consider cultural and social influences on pain and pain management  - Manage/alleviate anxiety  - Utilize distraction and/or relaxation techniques  - Monitor for opioid side effects  - Notify MD/LIP if interventions unsuccessful or patient reports new pain  - Anticipate increased pain with activity and pre-medicate as appropriate  Outcome: Progressing     Problem: SAFETY ADULT - FALL  Goal: Free from fall injury  Description: INTERVENTIONS:  - Assess pt frequently for physical needs  - Identify cognitive and physical deficits and behaviors that affect risk of falls.  - Drew fall precautions as indicated by assessment.  - Educate pt/family on patient safety including physical limitations  - Instruct pt to call for assistance with activity based on  assessment  - Modify environment to reduce risk of injury  - Provide assistive devices as appropriate  - Consider OT/PT consult to assist with strengthening/mobility  - Encourage toileting schedule  Outcome: Progressing     Problem: DISCHARGE PLANNING  Goal: Discharge to home or other facility with appropriate resources  Description: INTERVENTIONS:  - Identify barriers to discharge w/pt and caregiver  - Include patient/family/discharge partner in discharge planning  - Arrange for needed discharge resources and transportation as appropriate  - Identify discharge learning needs (meds, wound care, etc)  - Arrange for interpreters to assist at discharge as needed  - Consider post-discharge preferences of patient/family/discharge partner  - Complete POLST form as appropriate  - Assess patient's ability to be responsible for managing their own health  - Refer to Case Management Department for coordinating discharge planning if the patient needs post-hospital services based on physician/LIP order or complex needs related to functional status, cognitive ability or social support system  Outcome: Progressing     Problem: CARDIOVASCULAR - ADULT  Goal: Maintains optimal cardiac output and hemodynamic stability  Description: INTERVENTIONS:  - Monitor vital signs, rhythm, and trends  - Monitor for bleeding, hypotension and signs of decreased cardiac output  - Evaluate effectiveness of vasoactive medications to optimize hemodynamic stability  - Monitor arterial and/or venous puncture sites for bleeding and/or hematoma  - Assess quality of pulses, skin color and temperature  - Assess for signs of decreased coronary artery perfusion - ex. Angina  - Evaluate fluid balance, assess for edema, trend weights  Outcome: Progressing  Goal: Absence of cardiac arrhythmias or at baseline  Description: INTERVENTIONS:  - Continuous cardiac monitoring, monitor vital signs, obtain 12 lead EKG if indicated  - Evaluate effectiveness of  antiarrhythmic and heart rate control medications as ordered  - Initiate emergency measures for life threatening arrhythmias  - Monitor electrolytes and administer replacement therapy as ordered  Outcome: Progressing

## 2024-07-30 NOTE — DISCHARGE SUMMARY
[facility]  part of [facility]    Discharge Summary    Demi Dubose Patient Status:  Inpatient    1950 MRN [id number]   Location [facility] 4W/SW/SE Attending Farzad Barajas MD   Hosp Day # 1 PCP Jeannette Palacios DO     Date of Admission: 2024   Date of Discharge: 2024    Admitting Diagnosis:   Cervical myelopathy, spinal stenosis of cervical region  Status post cervical spinal fusion      Discharge Diagnosis:   .Active Problems:    Status post cervical spinal fusion        HPI: Pt is a 74 y/o female with PMH of Hypothyroidism, RA, HTN and cervical radiculopathy who was admitted for scheduled C3-4 anterior cervical discectomy and fusion by Dr. Argueta on 24.   Hospital Course:   *Cervical radiculopathy  S/P C3-4 anterior cervical discectomy and fusion by Dr. Argueta on 24  POD#1  Cervical brace  Pain control  Bowel protocol  PT/OT  F/U with Dr. Argueta in 2 weeks      *HTN  Cont coreg and losartan     *Hypothyroidism  Cont levothyroxine     Discharge Physical Exam:   Blood pressure 158/84, pulse 90, temperature 97.6 °F (36.4 °C), temperature source Temporal, resp. rate 16, height 5' 9\" (1.753 m), weight 193 lb (87.5 kg), last menstrual period 2001, SpO2 96%.  Physical Exam  Vitals and nursing note reviewed.   Constitutional:       Appearance: Normal appearance.   HENT:      Head: Normocephalic and atraumatic.   Eyes:      Conjunctiva/sclera: Conjunctivae normal.   Cardiovascular:      Rate and Rhythm: Normal rate and regular rhythm.      Pulses: Normal pulses.      Heart sounds: Normal heart sounds.   Pulmonary:      Effort: Pulmonary effort is normal.      Breath sounds: Normal breath sounds.   Abdominal:      General: Bowel sounds are normal.      Palpations: Abdomen is soft.   Musculoskeletal:      Cervical back: Tenderness present. Decreased range of motion.      Comments: Cervical brace   Skin:     General: Skin is warm and dry.   Neurological:       General: No focal deficit present.      Mental Status: She is alert and oriented to person, place, and time.   Psychiatric:         Mood and Affect: Mood normal.         Behavior: Behavior normal.         Consultants         Provider   Role Specialty     Farzad Barajas MD      Consulting Physician Internal Medicine          Surgical Procedures       Case IDs Date Procedure Surgeon Location Status    [id number] 7/29/24 C3-4 anterior cervical discectomy and fusion, use of allograft bone Asher Argueta MD [facility] MAIN OR [facility]              Discharge Plan:   Discharge Medications:   Current Discharge Medication List        START taking these medications    Details   tiZANidine 4 MG Oral Tab Take 1 tablet (4 mg total) by mouth 3 (three) times daily for 14 days.  Qty: 42 tablet, Refills: 0    Associated Diagnoses: Status post cervical spinal fusion      traMADol 50 MG Oral Tab Take 1 tablet (50 mg total) by mouth every 6 (six) hours as needed for Pain.  Qty: 40 tablet, Refills: 0    Associated Diagnoses: Status post cervical spinal fusion      docusate sodium 100 MG Oral Cap Take 100 mg by mouth 2 (two) times daily.           CONTINUE these medications which have NOT CHANGED    Details   fluticasone furoate-vilanterol (BREO ELLIPTA) 100-25 MCG/ACT Inhalation Aerosol Powder, Breath Activated Inhale 1 puff into the lungs daily.      Multiple Vitamins-Minerals (MULTI-VITAMIN/MINERALS) Oral Tab Take 1 tablet by mouth daily.      losartan 100 MG Oral Tab Take 1 tablet (100 mg total) by mouth daily.  Qty: 90 tablet, Refills: 1      carvedilol 3.125 MG Oral Tab Take 1 tablet (3.125 mg total) by mouth 2 (two) times daily with meals.      LEVOTHYROXINE 75 MCG Oral Tab TAKE 1 TABLET(75 MCG) BY MOUTH DAILY  Qty: 90 tablet, Refills: 1      alendronate 70 MG Oral Tab Take 1 tablet (70 mg total) by mouth once a week.  Qty: 12 tablet, Refills: 1    Associated Diagnoses: Osteopenia of both hips      predniSONE 20 MG Oral Tab Take 1 tablet  (20 mg total) by mouth daily.  Qty: 90 tablet, Refills: 1      MONTELUKAST 10 MG Oral Tab TAKE 1 TABLET(10 MG) BY MOUTH EVERY NIGHT  Qty: 90 tablet, Refills: 1      inFLIXimab (REMICADE IV) Inject into the vein. Every 6 weeks      folic acid 1 MG Oral Tab Take 1 tablet (1,000 mcg total) by mouth daily.  Qty: 90 tablet, Refills: 1    Associated Diagnoses: Seronegative rheumatoid arthritis of multiple sites (HCC); Vitamin D deficiency; Osteopenia of both hips; Personal history of high risk medication treatment; Encounter for long-term (current) use of high-risk medication; Osteopenia, unspecified location      acetaminophen 325 MG Oral Tab Take 2 tablets (650 mg total) by mouth every 8 (eight) hours as needed for Pain.  Refills: 0    Associated Diagnoses: Degenerative joint disease (DJD) of lumbar spine      Cholecalciferol (VITAMIN D OR) 4,000 Units daily.    Associated Diagnoses: Inflammatory polyarthritis (HCC); PPD positive, treated; Follow-up examination following completed treatment with high-risk medications, not elsewhere classified; Encounter for long-term (current) use of other medications      CALCIUM OR 1 tablet daily      methotrexate 2.5 MG Oral Tab Take 5 tablets (12.5 mg total) by mouth every 7 days.  Qty: 60 tablet, Refills: 0    Associated Diagnoses: Osteopenia of both hips; Seronegative rheumatoid arthritis of multiple sites (HCC); Vitamin D deficiency; Personal history of high risk medication treatment; Encounter for long-term (current) use of high-risk medication; Osteopenia, unspecified location      VENTOLIN  (90 Base) MCG/ACT Inhalation Aero Soln Inhale 2 puffs into the lungs every 4 (four) hours as needed for Wheezing.  Qty: 54 g, Refills: 1                    Discharge Diet: As tolerated  Discharge Condition: Stable  Discharge Activity:  Per ortho instructions  Disposition: Home    Follow up:      Follow-up Information       Jeannette Palacios DO. Schedule an appointment as soon as  possible for a visit in 1 week(s).    Specialty: Family Medicine  Contact information:  [address]  [address]  [facility] [id number] [phone]               Asher Argueta MD. Go in 2 week(s).    Specialties: Surgery, Orthopaedic, SURGERY, ORTHOPEDIC  Contact information:  [address] [facility] [address]  [phone]                                   SHAVONNE PÉREZ MD  Office: [phone]  7/30/2024

## (undated) DEVICE — BIT DRL 11MM TI ALLY FOR ACP SPNL PLT SYS

## (undated) DEVICE — ADHESIVE SKIN TOP FOR WND CLSR DERMBND ADV

## (undated) DEVICE — GLOVE SUR 6.5 SENSICARE PI MIC PIP CRM PWD F

## (undated) DEVICE — APPLICATOR PREP 10.5ML ORNG CHG 2% ISO ALC

## (undated) DEVICE — SPONGE: SPECIALTY PEANUT XR 100/CS: Brand: MEDICAL ACTION INDUSTRIES

## (undated) DEVICE — SPONGE GZ 4X4IN COT 12 PLY TYP VII WVN C

## (undated) DEVICE — SOLUTION IRRIG 1000ML 0.9% NACL USP BTL

## (undated) DEVICE — KIT HEMSTAT MTRX 8ML PORCINE GEL HUM THROM

## (undated) DEVICE — GAUZE,SPONGE,4"X4",16PLY,XRAY,STRL,LF: Brand: MEDLINE

## (undated) DEVICE — ANTIBACTERIAL UNDYED BRAIDED (POLYGLACTIN 910), SYNTHETIC ABSORBABLE SUTURE: Brand: COATED VICRYL

## (undated) DEVICE — GLOVE SUR 8 SENSICARE PI MIC PIP CRM PWD F

## (undated) DEVICE — DRAPE,THYROID,SOFT,STERILE: Brand: MEDLINE

## (undated) DEVICE — MONITORING NEUROPHYSIOLOGICAL

## (undated) DEVICE — SUT VCRL 2-0 18IN ABSRB UD CR L26MM CT-2

## (undated) DEVICE — CERVICAL CDS: Brand: MEDLINE INDUSTRIES, INC.

## (undated) DEVICE — GLOVE SUR 8.5 SENSICARE NEOPR PWD F

## (undated) DEVICE — FORCEP CUSH BAY BP DISP 7.5IN

## (undated) DEVICE — YANKAUER,FLEXIBLE HANDLE,REGLR CAPACITY: Brand: MEDLINE INDUSTRIES, INC.

## (undated) DEVICE — GLOVE SUR 8 SENSICARE PI PIP GRN PWD F

## (undated) DEVICE — C-ARMOR C-ARM EQUIPMENT COVERS CLEAR STERILE UNIVERSAL FIT 12 PER CASE: Brand: C-ARMOR

## (undated) DEVICE — E-Z CLEAN, NON-STICK, PTFE COATED, ELECTROSURGICAL NEEDLE ELECTRODE, MODIFIED EXTENDED INSULATION, 2.75 INCH (7 CM): Brand: MEGADYNE

## (undated) DEVICE — GLOVE SUR 7 SENSICARE PI PIP GRN PWD F

## (undated) DEVICE — INTENDED USE FOR SURGICAL MARKING ON INTACT SKIN, ALSO PROVIDES A PERMANENT METHOD OF IDENTIFYING OBJECTS IN THE OPERATING ROOM: Brand: WRITESITE® PLUS MINI PREP RESISTANT MARKER

## (undated) DEVICE — MAXCESS C MODULE

## (undated) DEVICE — SHEET,DRAPE,53X77,STERILE: Brand: MEDLINE

## (undated) DEVICE — SUCTION CANISTER, 3000CC,SAFELINER: Brand: DEROYAL

## (undated) DEVICE — PIN DISTR L12MM SPNL MAXCESS-C